# Patient Record
Sex: FEMALE | Race: BLACK OR AFRICAN AMERICAN
[De-identification: names, ages, dates, MRNs, and addresses within clinical notes are randomized per-mention and may not be internally consistent; named-entity substitution may affect disease eponyms.]

---

## 2019-02-24 ENCOUNTER — HOSPITAL ENCOUNTER (EMERGENCY)
Dept: HOSPITAL 25 - UCCORT | Age: 34
Discharge: HOME | End: 2019-02-24
Payer: COMMERCIAL

## 2019-02-24 VITALS — SYSTOLIC BLOOD PRESSURE: 136 MMHG | DIASTOLIC BLOOD PRESSURE: 72 MMHG

## 2019-02-24 DIAGNOSIS — Z91.09: ICD-10-CM

## 2019-02-24 DIAGNOSIS — F17.210: ICD-10-CM

## 2019-02-24 DIAGNOSIS — J02.0: Primary | ICD-10-CM

## 2019-02-24 DIAGNOSIS — R09.81: ICD-10-CM

## 2019-02-24 LAB
FLUAV RNA SPEC QL NAA+PROBE: NEGATIVE
FLUBV RNA SPEC QL NAA+PROBE: NEGATIVE

## 2019-02-24 PROCEDURE — 99212 OFFICE O/P EST SF 10 MIN: CPT

## 2019-02-24 PROCEDURE — G0463 HOSPITAL OUTPT CLINIC VISIT: HCPCS

## 2019-02-24 PROCEDURE — 87651 STREP A DNA AMP PROBE: CPT

## 2019-02-24 NOTE — UC
FLU HPI





- HPI Summary


HPI Summary: 





per nurse traige: CHILLS, FEVER, SORE THROAT, BODY ACHES. RUNNY NOSE SINCE 

TODAY AT MIDNIGHT. NO COUGH. NO N/V/D. PT WORKS IN THE HOSPITAL AS AN CNA. 


[ End ]





- History of Current Complaint


Chief Complaint: UCRespiratory


Stated Complaint: SORE THROAT


Time Seen by Provider: 02/24/19 11:33


Hx Obtained From: Patient


Hx Last Menstrual Period: 2/17/19


Pregnant?: No


Onset/Duration: Sudden Onset


Severity Currently: Mild


Severity Initially: Moderate


Pain Intensity: 6


Associated Signs & Symptoms: Positive: Myalgia, Cough, Sore Throat, Nasal 

Congestion, Headache, Vomiting


Related Hx: Possible Flu/Infectious Exposure





- Allergy/Home Medications


Allergies/Adverse Reactions: 


 Allergies











Allergy/AdvReac Type Severity Reaction Status Date / Time


 


nickel Allergy Unknown Rash Verified 02/24/19 11:17











Home Medications: 


 Home Medications





FLUoxetine CAP* [PROzac CAP*] 10 mg PO DAILY 02/24/19 [History Confirmed 02/24/ 19]











PMH/Surg Hx/FS Hx/Imm Hx


Previously Healthy: Yes





- Surgical History


Surgical History: Yes


Surgery Procedure, Year, and Place: BOWEL RESECT--2008  CARCINOID APPENDIX.  

APPY--2008.  TUBAL LIGATION





- Family History


Known Family History: Positive: Diabetes





- Social History


Alcohol Use: Occasionally


Substance Use Type: None


Smoking Status (MU): Light Every Day Tobacco Smoker


Type: Cigarettes


Amount Used/How Often: 5 CIGS A DAY





Review of Systems


All Other Systems Reviewed And Are Negative: Yes


Constitutional: Positive: Fever, Chills, Fatigue


Skin: Positive: Negative


Eyes: Positive: Negative


ENT: Positive: Sore Throat, Ear Ache, Nasal Discharge, Sinus Congestion


Cardiovascular: Positive: Negative


Gastrointestinal: Positive: Negative


Genitourinary: Positive: Negative


Motor: Positive: Negative


Neurovascular: Positive: Negative


Musculoskeletal: Positive: Myalgia


Neurological: Positive: Headache


Psychological: Positive: Negative


Is Patient Immunocompromised?: No





Physical Exam


Triage Information Reviewed: Yes


Appearance: Well-Nourished, Ill-Appearing, Pain Distress


Vital Signs: 


 Initial Vital Signs











Temp  99 F   02/24/19 11:18


 


Pulse  101   02/24/19 11:18


 


Resp  24   02/24/19 11:18


 


BP  136/72   02/24/19 11:18


 


Pulse Ox  100   02/24/19 11:18











Vital Signs Reviewed: Yes


Eye Exam: Normal


ENT: Positive: Pharyngeal erythema, Nasal congestion, Nasal drainage, TMs normal

, Hoarse voice


Dental Exam: Normal


Neck exam: Normal


Respiratory Exam: Normal


Respiratory: Positive: Chest non-tender, Lungs clear, Normal breath sounds


Cardiovascular Exam: Normal


Cardiovascular: Positive: RRR, No Murmur, Pulses Normal


Abdominal Exam: Normal


Bowel Sounds: Positive: Present


Musculoskeletal Exam: Normal


Neurological Exam: Normal


Psychological Exam: Normal


Skin Exam: Normal





Flu Course/Dx





- Course


Course Of Treatment: hx obtained,exam performed ,meds reviewed, rapid flu and 

strep obtained. positive for strep, treated with amoxicillin





- Differential Dx/Diagnosis


Differential Diagnosis/HQI/PQRI: Influenza, Upper Respiratory Infection, Other 

- strep throat


Provider Diagnosis: 


 Strep pharyngitis








Discharge





- Sign-Out/Discharge


Documenting (check all that apply): Patient Departure


All imaging exams completed and their final reports reviewed: No Studies





- Discharge Plan


Condition: Stable


Disposition: HOME


Prescriptions: 


Amoxicillin PO (*) [Amoxicillin 500 MG CAP*] 500 mg PO BID #20 cap


Patient Education Materials:  Strep Throat (DC)


Referrals: 


No Primary Care Phys,NOPCP [Primary Care Provider] - 


Additional Instructions: 


1. take the medication as prescribed.


2. FOllow up if not improving int he next 3-4 days.





- Billing Disposition and Condition


Condition: STABLE


Disposition: Home





- Attestation Statements


Provider Attestation: 





Per institutional requirements, I have reviewed the chart, however, I was not 

consulted specifically or made aware of this patient by the  midlevel provider.

  I did not personally evaluate, interact with , or disposition  this patient.